# Patient Record
Sex: FEMALE | Race: WHITE | HISPANIC OR LATINO | URBAN - METROPOLITAN AREA
[De-identification: names, ages, dates, MRNs, and addresses within clinical notes are randomized per-mention and may not be internally consistent; named-entity substitution may affect disease eponyms.]

---

## 2017-04-25 ENCOUNTER — APPOINTMENT (RX ONLY)
Dept: URBAN - METROPOLITAN AREA CLINIC 77 | Facility: CLINIC | Age: 32
Setting detail: DERMATOLOGY
End: 2017-04-25

## 2017-04-25 DIAGNOSIS — Z41.9 ENCOUNTER FOR PROCEDURE FOR PURPOSES OTHER THAN REMEDYING HEALTH STATE, UNSPECIFIED: ICD-10-CM

## 2017-04-25 PROCEDURE — ? LASER HAIR REMOVAL

## 2017-04-25 ASSESSMENT — LOCATION DETAILED DESCRIPTION DERM
LOCATION DETAILED: RIGHT LABIUM MAJUS
LOCATION DETAILED: GLUTEAL CLEFT
LOCATION DETAILED: RIGHT MEDIAL BUTTOCK
LOCATION DETAILED: MONS PUBIS

## 2017-04-25 ASSESSMENT — LOCATION ZONE DERM
LOCATION ZONE: VULVA
LOCATION ZONE: TRUNK

## 2017-04-25 ASSESSMENT — LOCATION SIMPLE DESCRIPTION DERM
LOCATION SIMPLE: GLUTEAL CLEFT
LOCATION SIMPLE: RIGHT BUTTOCK
LOCATION SIMPLE: LABIA MAJORA
LOCATION SIMPLE: GROIN

## 2017-04-25 NOTE — PROCEDURE: LASER HAIR REMOVAL
Number Of Prepaid Treatments (Will Not Render If 0): 0
Spot Size: 3 mm
Length Of Topical Anesthesia Application (Optional): 30 minutes
Detail Level: Zone
Number Of Prepaid Treatments (Will Not Render If 0): 7
Pulse Duration: 25 ms
Consent: Written consent obtained, risks reviewed including but not limited to crusting, scabbing, blistering, scarring, darker or lighter pigmentary change, paradoxical hair regrowth, incomplete removal of hair and infection.
Pulse Duration: 0.45 ms
Post-Procedure Care: Immediate endpoint: perifollicular erythema and edema. Elta Laser Cooling Enzyme Gel and spf. Post care reviewed with patient: wear sunscreen, do not pluck or wax in between patients and use mint exfoliating scrub on day 5 for 5 days in a row.
Cooling: DCD setting
Laser Type: Vbeam 595nm
Total Pulses: 131
Spot Size: 1.5 mm
Price (Use Numbers Only, No Special Characters Or $): 125.00
Cooling: chill tip
Fluence (Will Not Render If 0): 10
Fluence (Will Not Render If 0): 20
Fluence (Will Not Render If 0): 13
Topical Anesthesia Type: BLT cream (benzocaine 20%, lidocaine 6%, tetracaine 4%)
Total Pulses: 314
Total Area (Optional- Include Units): 1
Cooling Override: 100%
Pulse Duration: 20 ms
Pre-Procedure: Prior to proceeding the treatment areas were cleaned and all present put on their eye protection.
Post-Care Instructions: I reviewed with the patient in detail post-care instructions. Patient should avoid sun for a minimum of 4 weeks before and after treatment.
Render Post-Care In The Note: No
Spot Size: 5 mm
Fluence (Will Not Render If 0): 20